# Patient Record
Sex: MALE
[De-identification: names, ages, dates, MRNs, and addresses within clinical notes are randomized per-mention and may not be internally consistent; named-entity substitution may affect disease eponyms.]

---

## 2017-02-24 ENCOUNTER — HOSPITAL ENCOUNTER (OUTPATIENT)
Dept: HOSPITAL 61 - INTRAD | Age: 67
Discharge: HOME | End: 2017-02-24
Attending: FAMILY MEDICINE
Payer: MEDICARE

## 2017-02-24 VITALS — SYSTOLIC BLOOD PRESSURE: 107 MMHG | DIASTOLIC BLOOD PRESSURE: 64 MMHG

## 2017-02-24 VITALS
SYSTOLIC BLOOD PRESSURE: 108 MMHG | SYSTOLIC BLOOD PRESSURE: 108 MMHG | DIASTOLIC BLOOD PRESSURE: 65 MMHG | DIASTOLIC BLOOD PRESSURE: 65 MMHG | SYSTOLIC BLOOD PRESSURE: 108 MMHG | SYSTOLIC BLOOD PRESSURE: 108 MMHG | SYSTOLIC BLOOD PRESSURE: 108 MMHG | DIASTOLIC BLOOD PRESSURE: 65 MMHG | DIASTOLIC BLOOD PRESSURE: 65 MMHG | DIASTOLIC BLOOD PRESSURE: 65 MMHG

## 2017-02-24 VITALS — SYSTOLIC BLOOD PRESSURE: 128 MMHG | DIASTOLIC BLOOD PRESSURE: 76 MMHG

## 2017-02-24 VITALS — SYSTOLIC BLOOD PRESSURE: 128 MMHG | DIASTOLIC BLOOD PRESSURE: 72 MMHG

## 2017-02-24 VITALS — DIASTOLIC BLOOD PRESSURE: 82 MMHG | SYSTOLIC BLOOD PRESSURE: 147 MMHG

## 2017-02-24 VITALS — DIASTOLIC BLOOD PRESSURE: 67 MMHG | SYSTOLIC BLOOD PRESSURE: 117 MMHG

## 2017-02-24 VITALS — DIASTOLIC BLOOD PRESSURE: 79 MMHG | SYSTOLIC BLOOD PRESSURE: 155 MMHG

## 2017-02-24 VITALS — DIASTOLIC BLOOD PRESSURE: 67 MMHG | SYSTOLIC BLOOD PRESSURE: 119 MMHG

## 2017-02-24 VITALS — HEIGHT: 72 IN | WEIGHT: 160 LBS | BODY MASS INDEX: 21.67 KG/M2

## 2017-02-24 VITALS — DIASTOLIC BLOOD PRESSURE: 70 MMHG | SYSTOLIC BLOOD PRESSURE: 116 MMHG

## 2017-02-24 DIAGNOSIS — I70.0: Primary | ICD-10-CM

## 2017-02-24 DIAGNOSIS — I70.211: ICD-10-CM

## 2017-02-24 LAB
ANION GAP SERPL CALC-SCNC: 7 MMOL/L (ref 6–14)
BASOPHILS # BLD AUTO: 0 X10^3/UL (ref 0–0.2)
BASOPHILS NFR BLD: 1 % (ref 0–3)
BUN SERPL-MCNC: 19 MG/DL (ref 8–26)
CALCIUM SERPL-MCNC: 9.2 MG/DL (ref 8.5–10.1)
CHLORIDE SERPL-SCNC: 107 MMOL/L (ref 98–107)
CO2 SERPL-SCNC: 30 MMOL/L (ref 21–32)
CREAT SERPL-MCNC: 1 MG/DL (ref 0.7–1.3)
EOSINOPHIL NFR BLD: 3 % (ref 0–3)
ERYTHROCYTE [DISTWIDTH] IN BLOOD BY AUTOMATED COUNT: 13.4 % (ref 11.5–14.5)
GFR SERPLBLD BASED ON 1.73 SQ M-ARVRAT: 74.8 ML/MIN
GLUCOSE SERPL-MCNC: 108 MG/DL (ref 70–99)
HCT VFR BLD CALC: 45.4 % (ref 39–53)
HGB BLD-MCNC: 15.3 G/DL (ref 13–17.5)
INR PPP: 1 (ref 0.8–1.1)
LYMPHOCYTES # BLD: 2 X10^3/UL (ref 1–4.8)
LYMPHOCYTES NFR BLD AUTO: 28 % (ref 24–48)
MCH RBC QN AUTO: 33 PG (ref 25–35)
MCHC RBC AUTO-ENTMCNC: 34 G/DL (ref 31–37)
MCV RBC AUTO: 96 FL (ref 79–100)
MONOCYTES NFR BLD: 8 % (ref 0–9)
NEUTROPHILS NFR BLD AUTO: 60 % (ref 31–73)
PLATELET # BLD AUTO: 160 X10^3/UL (ref 140–400)
POTASSIUM SERPL-SCNC: 5 MMOL/L (ref 3.5–5.1)
PROTHROMBIN TIME: 12.8 SEC (ref 11.7–14)
RBC # BLD AUTO: 4.71 X10^6/UL (ref 4.3–5.7)
SODIUM SERPL-SCNC: 144 MMOL/L (ref 136–145)
WBC # BLD AUTO: 7.1 X10^3/UL (ref 4–11)

## 2017-02-24 PROCEDURE — C1892 INTRO/SHEATH,FIXED,PEEL-AWAY: HCPCS

## 2017-02-24 PROCEDURE — 76937 US GUIDE VASCULAR ACCESS: CPT

## 2017-02-24 PROCEDURE — 80048 BASIC METABOLIC PNL TOTAL CA: CPT

## 2017-02-24 PROCEDURE — 75625 CONTRAST EXAM ABDOMINL AORTA: CPT

## 2017-02-24 PROCEDURE — G0269 OCCLUSIVE DEVICE IN VEIN ART: HCPCS

## 2017-02-24 PROCEDURE — 36415 COLL VENOUS BLD VENIPUNCTURE: CPT

## 2017-02-24 PROCEDURE — C1760 CLOSURE DEV, VASC: HCPCS

## 2017-02-24 PROCEDURE — C1876 STENT, NON-COA/NON-COV W/DEL: HCPCS

## 2017-02-24 PROCEDURE — 37221: CPT

## 2017-02-24 PROCEDURE — 85027 COMPLETE CBC AUTOMATED: CPT

## 2017-02-24 PROCEDURE — 85610 PROTHROMBIN TIME: CPT

## 2017-02-24 PROCEDURE — C1894 INTRO/SHEATH, NON-LASER: HCPCS

## 2017-02-24 PROCEDURE — 75716 ARTERY X-RAYS ARMS/LEGS: CPT

## 2017-02-24 PROCEDURE — C1769 GUIDE WIRE: HCPCS

## 2017-02-24 PROCEDURE — A4215 STERILE NEEDLE: HCPCS

## 2017-02-24 PROCEDURE — C1758 CATHETER, URETERAL: HCPCS

## 2017-02-24 NOTE — PDOC
Exam








Karina





Assistant


Assistant


WILLIAMS Da Silva





Pre-Procedure Diagnosis


Pre-Procedure Diagnosis


67 YO male smoker with PAD, diminished rt CFA and DP pulses,  right hip 

claudication, and relatively lesser left leg pain.





Post-Procedure Diagnosis


Post-Procedure Diagnosis


Same.


Irregular, at least 60% right CHARLENE stenosis.





Procedure Performed


Procedure Performed


Abdominal aortogram with left leg angio via sono guided left CFA access.


Right lower extremity angio via sono guided right CFA access.


Bilateral CHARLENE kissing stents, covered on right and bare metal on left.





Type of Anesthesia


Type of Anesthesia


Local + Mod sedation





Estimated Blood Loss


EBL:


50 cc





Condition of Patient


Condition of Patient


Stable.  No apparent complication.  Improved right CFA and DP pulses post 

procedure.





Disposition


Disposition


Home from [facility] post recovery, if no problems.  F/u with Dr Nevarez.  Single dose 

of Plavix today, then resume Savaysa tomorrow.  Full report to follow.








JOSR ESCOBAR MD Feb 24, 2017 12:14

## 2017-02-24 NOTE — PDOC
MODERATE SEDATION ASSESSMENT


RISKS/ALTERNATIVES


Risks/Alternatives


Risks and alternatives of this type of sedation and procedure discussed with:


RISK/ALTERNATIVES:  Patient





H & P ON CHART


H & P


H & P on chart and reviewed for co-morbid conditions and appropriate labs.


H&P ON CHART:  Yes





PREGNANCY STATUS


PREG STATUS ASSESSED:  N/A





MEDS/ALLERGIES REVIEWED


Meds/Allergies Reviewed


Medications and Allergies including time and route of recently administered 

narcotics and sedatives.


MEDS/ALLERGIES REVIEWED:  Yes





ASA RATING


ASA RATING:  II





AIRWAY ASSESSMENT


Airway Assessment


Airway patency, oral function limitations, presence  of caps, crowns, dentures, 

partials, and ability to extend neck assessed.


AIRWAY ASSESSMENT:  Yes





MALLAMPATI SCORE


MALLAMPATI SCORE:  II





PRE-SEDATION ASSESSMENT


PRE-SEDATION ASSESSMENT:  Yes








OJSR ESCOBAR MD Feb 24, 2017 11:59

## 2017-02-24 NOTE — PDOC1
History and Physical


Date of Procedure


Date of Admission


2/24/17





Procedure


Procedure


Diagnostic arteriogram +/- intervention





Indication


Indication


65 YO smoker with chronic right hip claudication.   PAD, with diminished right 

CFA and DP pulses compared to left.   Minor left leg pain.





Past Medical History


Past Medical History


See Nursing Pre procedure PMH





Past Surgical History


Past Surgical History


See Nursing Pre Procedure PSH





Current Medications


Current Medications





 Current Medications


Iodixanol (Visipaque 320) 50 ml STK-MED ONCE .ROUTE ;  Start 2/24/17 at 09:54;  

Stop 2/24/17 at 09:55;  Status DC


Iohexol (Omnipaque 300 Mg/ml) 100 ml STK-MED ONCE .ROUTE ;  Start 2/24/17 at 09:

54;  Stop 2/24/17 at 09:55;  Status DC


Lidocaine/Sodium Bicarbonate 20 ml 20 ml STK-MED ONCE IJ ;  Start 2/24/17 at 09:

54;  Stop 2/24/17 at 09:55;  Status DC


Heparin Sodium/ Sodium Chloride 1,500 ml @  As Directed STK-MED ONCE .ROUTE ;  

Start 2/24/17 at 09:54;  Stop 2/24/17 at 09:55;  Status DC


Iodixanol (Visipaque 320) 100 ml STK-MED ONCE .ROUTE ;  Start 2/24/17 at 09:55;

  Stop 2/24/17 at 09:56;  Status DC


Heparin Sodium (Porcine) 10,000 unit STK-MED ONCE .ROUTE ;  Start 2/24/17 at 10:

27;  Stop 2/24/17 at 10:28;  Status DC


Midazolam HCl (Versed) 5 mg STK-MED ONCE .ROUTE ;  Start 2/24/17 at 10:28;  

Stop 2/24/17 at 10:29;  Status DC


Fentanyl Citrate (Fentanyl 5ml Vial) 250 mcg STK-MED ONCE .ROUTE ;  Start 2/24/ 17 at 10:28;  Stop 2/24/17 at 10:29;  Status DC


Heparin Sodium/ Sodium Chloride 1,000 unit 1X  ONCE IART  Last administered on 2 /24/17at 11:40;  Start 2/24/17 at 11:00;  Stop 2/24/17 at 11:01;  Status DC


Lidocaine/Sodium Bicarbonate (Buffered Lidocaine 1%) 20 ml 1X  ONCE IJ  Last 

administered on 2/24/17at 11:40;  Start 2/24/17 at 11:00;  Stop 2/24/17 at 11:01

;  Status DC


Iohexol (Omnipaque 300 Mg/ml) 100 ml 1X  ONCE IART  Last administered on 2/24/ 17at 11:40;  Start 2/24/17 at 11:00;  Stop 2/24/17 at 11:01;  Status DC


Iodixanol (Visipaque 320) 100 ml 1X  ONCE IART  Last administered on 2/24/17at 

11:40;  Start 2/24/17 at 11:00;  Stop 2/24/17 at 11:01;  Status DC


Heparin Sodium (Porcine) 10,000 unit 1X  ONCE IV  Last administered on 2/24/ 17at 11:42;  Start 2/24/17 at 11:00;  Stop 2/24/17 at 11:01;  Status DC


Midazolam HCl (Versed) 5 mg 1X  ONCE IV  Last administered on 2/24/17at 11:41;  

Start 2/24/17 at 11:00;  Stop 2/24/17 at 11:01;  Status DC


Fentanyl Citrate (Fentanyl 5ml Vial) 250 mcg 1X  ONCE IV  Last administered on 2 /24/17at 11:41;  Start 2/24/17 at 11:00;  Stop 2/24/17 at 11:01;  Status DC


Clopidogrel Bisulfate (Plavix) 300 mg 1X  ONCE PO ;  Start 2/24/17 at 12:00;  

Stop 2/24/17 at 12:01





Active Scripts


Active


Reported


DURAGESIC 100mcg/hr (Fentanyl) 1 Each Patch.td72 1 Patch TD Q72H


Atorvastatin Calcium 10 Mg Tablet 10 Mg PO HS


Lisinopril 10 Mg Tablet 10 Mg PO DAILY


Savaysa (Edoxaban Tosylate) 60 Mg Tablet 60 Mg PO DAILY





Allergies


Allergies:  


Coded Allergies:  


     tamsulosin (Verified  Allergy, Intermediate, Unknown, 2/24/17)


     quetiapine (Verified  Adverse Reaction, Intermediate, memory loss, 2/24/17)


     rivaroxaban (Verified  Adverse Reaction, Unknown, leg pain, 2/24/17)





Physical Exam


Vital Signs





 Vital Signs








  Date Time  Temp Pulse Resp B/P Pulse Ox O2 Delivery O2 Flow Rate FiO2


 


2/24/17 11:41   14  99 Room Air  


 


2/24/17 11:30  54      


 


2/24/17 09:26 98.2   119/67    





 98.2       








Lungs:  Clear to auscultation


Heart:  Regular rate


Psych/Mental Status:  Mental status NL


Vascular


2+ left CFA pulse.


+/- right CFA pulse.


2+ left DP pulse


1+ right DP pulse.


No foot ulcerations.





Diagnostic Data/Imaging


Images


No prior imaging available





Assessment


Assessment


65 YO smoker with PAD, diminished right CFA and DP pulses, and right hip 

claudication.


Problems:  





Plan


Plan


Diagnostic abdominal aortogram with bilateral lower extremity r/o +/- 

intervention.








JOSR ESCOBAR MD Feb 24, 2017 12:06

## 2017-02-25 NOTE — RAD
Abdominal aortogram with left lower extremity arteriogram via

ultrasound-guided left common femoral artery access



Right lower extremity arteriogram via ultrasound-guided right common femoral

artery access



Bilateral common iliac artery kissing stent deployment



Indication: 66-year-old male smoker with PAD, with diminished right groin and

distal pulses, with significant right hip claudication, and with lesser left

leg pain.  Diagnostic arteriogram, with possible intervention, has been

requested by primary care physician.



Fluoroscopy time: 11.6 minutes



Kerma-area Product:    99 Gycm2



Contrast material: 55 cc Omnipaque 300.     130 cc Visipaque 320.



Anesthesia: 55 minutes moderate sedation was provided utilizing a total of 2

mg Versed and 75 mcg been no, IV. The patient was appropriately monitored by a

qualified independent observer throughout the time of moderate sedation.



Consent: The procedure was explained in its entirety to the patient and/or the

patient's designated representative by a member of the treatment team. This

included a discussion of risks and benefits and commonly accepted alternatives

to the procedure, as well as expected consequences of no treatment at all.

Discussion of risks included, but was not limited to, those that are most

frequent and those that are rare, but possibly severe or life-threatening, as

well as the possibility of unforeseen complications.



Sterility: All elements of maximal sterile barrier technique were utilized,

including cap, mask, sterile gown, sterile gloves, large sterile sheet,

appropriate hand hygiene, and 2% chlorhexidine for cutaneous antisepsis.





Procedure: Informed consent was obtained from the patient. He was placed

supine on the angiography table. Preliminary ultrasound examination of left

groin groin revealed wide patency of left common femoral artery, which was

documented with a single hard copy ultrasound image. Left groin was then

prepped and draped in the usual sterile fashion, utilizing all elements of

maximal sterile barrier technique, as described above. Moderate sedation was

provided with IV Versed and fentanyl. Using aseptic technique, local

anesthesia, direct ultrasound guidance, and the micropuncture system, a 5

[place] left common femoral artery sheath was successfully introduced.



Abdominal aortogram: A 5 [place] Omni Flush catheter was advanced through the

left groin sheath and was positioned within suprarenal abdominal aorta.

Omnipaque 300 was injected and abdominal aortogram DSA images were obtained.

Findings: Infrarenal abdominal aortic shows at least moderate atherosclerotic

calcification and plaquing, without significant stricture, and without

aneurysmal dilatation. Single main renal arteries appear widely patent,

bilaterally. Celiac trunk and SMA are patent. There is severe stenosis at

origin of small caliber DIMA.



Oblique pelvis injections: The Omni Flush catheter was withdrawn into terminal

aorta, just above bifurcation. Omnipaque 300 was again injected and DSA images

were obtained over pelvis in [place]ian and BAXTER projections.

Findings: Markedly irregular atherosclerotic plaquing within proximal segment

of right common iliac artery includes a localized high-grade stenosis. Left

common iliac artery shows only mild eccentric plaquing. External iliac

arteries are widely patent, bilaterally. Both hypogastric arteries are widely

patent, as well.



Left lower extremity arteriogram: The Omni Flush catheter was withdrawn into

distal segment of ipsilateral left external iliac artery. Visipaque was

injected and digital angiographic images were obtained from groin through

foot.

Findings: Left common femoral artery, bifurcation, and deep femoral artery are

widely patent. Left SFA-popliteal arterial segment is also widely patent, as

is left tibial trifurcation, with good three-vessel distal runoff. 



Bilateral common iliac artery kissing stents:  Given this patient's

debilitating right hip claudication, the decision was made to proceed with

percutaneous stenting of proximal right common iliac artery, requiring

simultaneous proximal left common iliac artery stenting to prevent

displacement of plaque from right common iliac artery origin into left side. 

Preliminary ultrasound examination over right groin revealed widely patent

right common femoral artery. This was documented with a single hard copy

ultrasound image. Right groin was then prepped and draped in the usual sterile

fashion, utilizing all elements of maximal sterile barrier technique, as

described above. Using aseptic technique, local anesthesia, direct ultrasound

guidance, and the micropuncture system, a 7 [place] 23 cm long right common

femoral artery sheath was successfully introduced over a Ibrahim wire, which was

positioned with its tip within descending thoracic aorta.  Using aseptic

technique and local anesthesia, the previously placed 5 [place] left common

femoral artery sheath was exchanged for a 7 [place] 23 cm long sheath over a

Ibrahim wire, which was also positioned with its tip within descending thoracic

aorta.  5000 heparin was given bolus IV.  Due to the markedly irregular nature

of right common iliac artery plaquing, a 7 mm x 39 mm atrium covered balloon

expandable stent was selected, and was advanced through the 7 [place] right

groin sheath and was carefully positioned across proximal right common iliac

artery, utilizing magnification fluoroscopic guidance and road mapping

technique.   A noncovered 8 mm x 29 mm Cordis Mary Ann balloon expandable stent

was advanced through the 7 [place] left groin sheath and was carefully

positioned across proximal left common iliac artery.  The 2 stents were then

simultaneously deployed under fluoroscopic control.   Post dilatation both

stents was then simultaneously performed utilizing 9 mm x 40 mm Cordis extreme

PTA balloons, each inflated to a peak pressure of 12 jv.  Completion DSA

images were then obtained, which brisk antegrade flow through bilateral common

iliac arteries, without complicating dissection or thrombosis.



Right lower extremity arteriogram:  The right groin 7 [place] 23 cm long sheath

was withdrawn and was positioned with its tip within anterolateral distal

right external iliac artery. Visipaque was injected and digital angiographic

images were obtained from right groin through distal calf..

Findings: Right common femoral artery and deep femoral artery are widely

patent. Right SFA-popliteal arterial segment is unremarkable. Right tibial

trifurcation is widely patent, with satisfactory three-vessel distal runoff

through visualized distal right calf.



Patient tolerated the procedures well without apparent location. Hemostasis

was achieved at both groin puncture sites utilizing the Angio-Seal system,

bilaterally.  Completion post stent placement physical examination revealed

significantly improved right common femoral artery and DP pulses.



Impression:

1.  At least moderate atherosclerotic plaquing throughout infrarenal abdominal

aorta, without aneurysmal dilatation and without significant stricture.



2.  Markedly irregular plaquing within proximal right common iliac artery,

including localized severe stenosis, may well be responsible for the patient's

significant right hip claudication.    Therefore, bilateral common iliac

artery kissing stents were successfully and uneventfully deployed.



3.  Widely patent SFA-popliteal segments, tibial trifurcations, and tibial

trifurcation runoff vessels, bilaterally.

## 2020-03-25 ENCOUNTER — HOSPITAL ENCOUNTER (OUTPATIENT)
Dept: HOSPITAL 61 - PNCL | Age: 70
End: 2020-03-25
Attending: ANESTHESIOLOGY
Payer: MEDICARE

## 2020-03-25 DIAGNOSIS — K51.90: ICD-10-CM

## 2020-03-25 DIAGNOSIS — I73.9: ICD-10-CM

## 2020-03-25 DIAGNOSIS — I10: Primary | ICD-10-CM

## 2020-03-25 DIAGNOSIS — J44.9: ICD-10-CM

## 2020-03-25 DIAGNOSIS — M13.88: ICD-10-CM

## 2020-03-25 DIAGNOSIS — H91.90: ICD-10-CM

## 2020-03-25 PROCEDURE — G0463 HOSPITAL OUTPT CLINIC VISIT: HCPCS

## 2020-03-25 NOTE — PAIN
DATE OF SERVICE:  03/25/2020



INITIAL CONSULTATION FOR PAIN CLINIC



CHIEF COMPLAINT:  Neck and left upper extremity pain.



HISTORY OF PRESENT ILLNESS:  This is a 69-year-old male who presents with

history of pain in the base of neck and left shoulder since about 2010.  The

patient has had many hard labor jobs, working for the railroad, also automotive

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

labor body work for automobiles.  The patient reports the pain is increasing in

his left upper extremity, radiating to the anterior bicep from the shoulder and

neck and into the anterior deltoid, anterior forearm, into the hand at times

with numbness and tingling.  The patient has this across the back and in the mid

upper back as well, has pain in the knees and the bilateral ankles and hips. 

The patient reports the pain in the neck and arm, it is becoming more constant,

sharp, stabbing, tingling with numbness, radiating to the left upper extremity,

changes during the day with activity, worse with activity, better with resting,

burning, cramping, cold and aching.  The patient reports it awakens him from

sleep at least 2-5 times a night, can affect his bowel and bladder control, but

no loss of continence.  The patient reports it does affect his ability to walk

fairly significantly, mostly in the knee and hip pain.  The patient has tried

physical therapy in the distant past, nothing recently.  He is doing some

stretching and strengthening and some heat applications which he reports is

quite soothing at that time in the neck and shoulder, but does not last long. 

The patient has been on fentanyl patches as well as hydrocodone, still taking

those today, which he reports does decrease the pain by about 50%.  The patient

did have an MRI scan of the cervical spine dated 03/05/2020 showing multiple

levels of broad-based disk protrusion at C3-C4, C4-C5, C5-C6 with bulging at

C6-C7 with broad-based protruded disk at C3-C3, left paracentral disk osteophyte

complex at C4-C5, C5-C6 large disk osteophyte complex as well involving the

right to left ventral thecal sac and cord and compression of the cord noted. 

The patient reports his disability rating from 0-10, 10 being the worst, is an 8

with family home responsibilities and recreation, 10 with occupation, 3 with

social activity, self-care and life support activities.



PAST MEDICAL HISTORY:  Significant for hearing loss, COPD, cigarette smoking,

continues to smoke 1-1-1/2 half pack a day for the past 55 years, history of

hypertension, peripheral vascular disease, ulcerative colitis, arthritis.



PREVIOUS SURGERY:  Include arterial stents in the bilateral iliacs,

hydrocelectomy, lumbar laminectomy x 3, venous ligation, kidney stone

extractions and shoulder surgery.



CURRENT MEDICATIONS:  Include lisinopril, atorvastatin, hydrocodone, fentanyl

patches and Savaysa.



ALLERGIES:  THE PATIENT IS ALLERGIC TO XARELTO, SEROQUEL, AND TAMSULOSIN.



FAMILY HISTORY:  Significant for heart disease, prostate cancer and memory

problems.



SOCIAL HISTORY:  The patient does not drink alcohol, does not use any illegal,

illicit or recreational drugs, does smoke about 1-1-1/2 packs of cigarettes, has

for 55 years, continues to smoke.  He is single.  Lives locally in [place], [place] and still does auto body repair currently.



REVIEW OF SYSTEMS:  The patient's review of systems is positive for those items

mentioned in history of present illness.  All systems reviewed and otherwise

negative.  It is complete, full and well documented on the patient's chart.



PHYSICAL EXAMINATION:

VITAL SIGNS:  The patient's blood pressure 143/81, pulse 55, respirations 18,

temperature is 98.3 degrees Fahrenheit, height is 6 feet and weight is 157

pounds.

GENERAL:  The patient is awake, alert, oriented, appropriate, very pleasant

demeanor.

HEENT:  Head shows normocephalic, atraumatic.  Extraocular movements are intact

and symmetrical.  Oral cavity:  Mucous membranes moist and pink.  Dentition is

intact.

NECK:  Shows anterior throat supple without palpable lymphadenopathy noted. 

Swallow reflex symmetrical.

CHEST:  Shows normal on inspection.  Breath sounds are clear bilaterally.

HEART:  Shows S1, S2 clear.  No murmurs auscultated.

ABDOMEN:  Soft, nontender, nondistended.  No palpable organomegaly is noted.  No

rebound or guarding demonstrated.

BACK:  Shows spine grossly in the midline, normal-appearing cervical lordotic

curvature, some minor increase in thoracic kyphotic curvature and flattening of

lumbar lordotic curvature with well-healed surgical scar noted in the lumbar

distribution.  Cervical paraspinous muscle shows symmetrical on inspection; on

palpation shows some moderate tenderness throughout the upper, middle and lower

distribution of paraspinous muscles bilaterally, but only diffusely without

significant radiation, no trigger points.  The patient shows good rotational

motion of cervical spine, both laterally as well as extension and flexion

greater than 45 degrees, right and left lateral as well as full extension, full

forward flexion with some moderate pain with all these maneuvers, especially

with turning to the left side with pain in the base of the neck radiating to the

left shoulder and trapezius, but without radiating into the arm:  The patient's

upper extremities show deep tendon reflexes at 2+ in the biceps and triceps

tendons.  Motor exam is 5/5 with  strength, bicep and tricep flexion and

symmetrical.  Peripheral pulses are 2+ radial distribution.  No peripheral edema

is noted.  Upper extremities are warm and dry to touch, equal in color and

appearance.  Shoulder shrug is strong and intact without loss of strength on

resistance as is abduction of the shoulders 90 degrees without loss of strength

with some minor pain reported in the base of the neck on the left side only

without radiation.  The patient's skin shows warm and dry, good turgor.  No

edema.  No sores, rashes or bruising throughout.



IMPRESSION:

1.  This is a 69-year-old male with a long history about 10 plus years pain in

the base of the neck, shoulders, upper extremities, upper back and now radiating

into the left upper extremity in a radicular fashion.

2.  MRI scan of cervical spine as noted.

3.  Hypertension.

4.  Chronic obstructive pulmonary disease.

5.  Hearing loss.

6.  Arthritis.

7.  Ulcerative colitis.

8.  Peripheral vascular disease.



PLAN:  Options were discussed with the patient including conservative medical

managements, physical therapies and interventional techniques.  He would like to

pursue interventional techniques.  We discussed a cervical epidural steroid

injection using description as well as anatomical models to describe the

procedure.  The patient would like to proceed with this.  We will first check

with his prescribing physician to hold his blood thinner, which is Savaysa for

48 hours and if deemed safe and appropriate, we will have him return for

cervical epidural steroid injection at that time.  In the meantime, the patient

will continue with stretching and strengthening exercises.  Also discussed heat

application to the neck and shoulders as well.  The patient will await clearance

to hold his blood thinner.  We will have him return for cervical epidural

steroid injection potentially at that time.

 



______________________________

YUDITH GOULD MD



DR:  Zane  JOB#:  [id number] / [id number]

DD:  03/25/2020 13:04  DT:  03/25/2020 14:01



LASHELL Zimmerman MD

## 2022-01-06 ENCOUNTER — HOSPITAL ENCOUNTER (OUTPATIENT)
Dept: HOSPITAL 61 - US | Age: 72
End: 2022-01-06
Attending: FAMILY MEDICINE
Payer: MEDICARE

## 2022-01-06 DIAGNOSIS — I65.23: Primary | ICD-10-CM

## 2022-01-06 PROCEDURE — 93880 EXTRACRANIAL BILAT STUDY: CPT

## 2022-01-07 NOTE — RAD
EXAM: Bilateral carotid duplex with waveform analysis.



CLINICAL HISTORY: Reason: SUBCLAVIAN STEAL SYNDROME, SUBCLAVIAN ARTERY history of smoking, dizziness,
 lightheadedness, weakness, numbness, unusual headaches, hypertension .



TECHNIQUE: Longitudinal and transverse sonographic images of the bilateral carotid arteries was perfo
rmed utilizing grayscale, color and spectral Doppler techniques.



COMPARISON: None



FINDINGS:

Right Carotid: There is mild calcified atherosclerosis proximally.

Left Carotid: Minimal calcified atherosclerosis proximally.  

Vertebrals: Antegrade flow bilaterally.



____________________________________



Right:

PSV CCA (cm/s):   68 cm/s

PSV ICA (cm/s):    100 cm/s

EDV ICA (cm/s):    35 cm/s

PSV ECA (cm/s):    84 cm/s

Subclavian PSV (cm/s):  88 cm/s

ICA/CCA Ratio:    Less than 2.0



Left:

PSV CCA (cm/s):   72 cm/s

PSV ICA (cm/s):    97 cm/s

EDV ICA (cm/s):    35 cm/s

PSV ECA (cm/s):    81 cm/s

Subclavian PSV (cm/s):  137 cm/s

ICA/CCA Ratio:    Less than 2.0



____________________________________





  

IMPRESSION:    

Mild bilateral internal carotid artery atherosclerosis, corresponding to less than 50 percent stenosi
s. No evidence of subclavian steal.







Consensus Panel Gray-scale and Doppler US Criteria for Diagnosis of ICA Stenosis

Degree of Stenosis (%)  ICA PSV

(Cm/sec)  Plaque Estimate (%)*

Normal  <125  None

<50  <125  <50

50-69  125-230  >50

>70 but 

< near occlusion  >230  >50

Near occlusion  High, low, or undetectable  Visible

Total occlusion  Undetectable  Visible, no detectable lumen

*Plaque estimate (diameter reduction) with gray-scale and color Doppler US



Degree of Stenosis (%)  ICA/CCA PSV Ratio     ICA EDV

(cm/sec)

Normal  <2.0  <40

<50  <2.0  <40

50-69  2.0-4.0  

>70 but 

< near occlusion  >4.0  >100

Near occlusion  Variable  Variable

Total occlusion  Not applicable  Not applicable



Electronically signed by: Sharath Tamayo MD (1/7/2022 10:09 AM) [id number]

## 2022-02-09 ENCOUNTER — HOSPITAL ENCOUNTER (OUTPATIENT)
Dept: HOSPITAL 61 - ECHO | Age: 72
End: 2022-02-09
Attending: FAMILY MEDICINE
Payer: COMMERCIAL

## 2022-02-09 DIAGNOSIS — Z85.038: Primary | ICD-10-CM

## 2022-02-09 PROCEDURE — 93660 TILT TABLE EVALUATION: CPT

## 2022-02-13 NOTE — CARD
MR#: [id number]

Account#: [id number]

Accession#: [id number]

Date of Study: 02/09/2022

Ordering Physician: LASHELL HOOPER

Referring Physician: LASHELL HOOPER

Tech: RISHI Peacock RN





--------------- APPROVED REPORT --------------





EXAM

Tilt Table

Attending Nurse: RISHI Peacock RN



HISTORY

The Patient is a 71 year-old male with a history of syncope



INDICATIONS

syncope



PROCEDURE

After explaining the risks, benefits, and alternative options, informed consent was obtained from the
 patient.

Base - lineRhythm: Sinus BradycardiaHR: 57 bpmBP: 165/95kmFtY1 Sat: 97 %

Fxee8509Ljylvu: Sinus BradycardiaHR: 57 bpmBP: 163/52jgCzP7 Sat: 97 %

Poub8407Rdsvtd: Sinus BradycardiaHR: 60 bpmBP: 152/19esXcC1 Sat: 96 %

80' Rzga1196Sfwanq: SinusHR: 61 bpmBP: 105/34vtGmE3 Sat: 96 %

80' Nwwq3240Ydugze: SinusHR: 62 bpmBP: 120/90muQcK4 Sat: 97 %

80' Jkcb0576Mctedl: Sinus BradycardiaHR: 59 bpmBP: 119/18hbUrB5 Sat: 100 %

80' Rtjw9093Glyiqe: SinusHR: 69 bpmBP: 111/38zkFeS1 Sat: 100 %

80' Wqyk0548Lqzsgd: SinusHR: 64 bpmBP: 129/26iqIfC5 Sat: 98 %

80' Hfhp2784Ehnjec: SinusHR: 63 bpmBP: 121/22ooXrX8 Sat: 98 %

80' Rtkf6288Pkikrz: SinusHR: 61 bpmBP: 115/89kzEyT2 Sat: 98 %

Sseq8959Tjglds: Sinus BradycardiaHR: 52 bpmBP: 119/73wiMiD9 Sat: 98 %

Nrdp8963Fditjt: Sinus BradycardiaHR: 50 bpmBP: 113/48rvBvR0 Sat: 98 %

O2 Sat: 98 %



CONCLUSION

Tilt table test did not show any evidence of neurocardiogenic syncope.



Signed by : Didier Cope, 

Electronically Approved : 02/13/2022 09:09:41

## 2022-04-12 ENCOUNTER — HOSPITAL ENCOUNTER (OUTPATIENT)
Dept: HOSPITAL 61 - NM | Age: 72
End: 2022-04-12
Attending: INTERNAL MEDICINE
Payer: COMMERCIAL

## 2022-04-12 DIAGNOSIS — I34.0: Primary | ICD-10-CM

## 2022-04-12 DIAGNOSIS — R07.9: ICD-10-CM

## 2022-04-12 DIAGNOSIS — R55: ICD-10-CM

## 2022-04-12 PROCEDURE — A9500 TC99M SESTAMIBI: HCPCS

## 2022-04-12 PROCEDURE — 93306 TTE W/DOPPLER COMPLETE: CPT

## 2022-04-12 PROCEDURE — 93017 CV STRESS TEST TRACING ONLY: CPT

## 2022-04-12 PROCEDURE — 78452 HT MUSCLE IMAGE SPECT MULT: CPT

## 2022-04-12 PROCEDURE — C8929 TTE W OR WO FOL WCON,DOPPLER: HCPCS

## 2022-04-12 NOTE — RAD
MR#: [id number]

Account#: [id number]

Accession#: [id number]

Date of Study: 04/12/2022

Ordering Physician: LE JOEL, 

Referring Physician: MATEUS LANDRY Tech: FEDERICA Murillo ARRT (R) (N)





--------------- APPROVED REPORT --------------





Test Type:          Pharmacological

Stress Nurse/Tech: July Cade RN

Test Indications: Chest pain and episodes of LOC

Cardiac History: Hypertension,smoker

Medications:     See Electronic Medical Record

Medical History: See Electronic Medical Record

Resting ECG:     SB

Resting Heart Rate: 53 bpm

Resting Blood Pressure: 144/78mmHg

Pretest Chest Pain: No chest pain



Nurse/Tech Notes

S1,S2 and lungs diminished in the bases.

Consent: The procedure was explained to the patient in lay terms. Informed consent was witnessed. Mark
eout was entered into [employer]. History and Stress Test performed by FEDERICA Murillo ARRT (R) 
(N)



Pharm. Details

Pharmacologic stress testing was performed using 0.4mg per 5ml of regadenoson given intravenously ove
r 7-10 seconds.



Stress Symptoms

Dyspnea,Nausea



POST EXERCISE

Reason for Termination: Infusion complete

Target HR: No

Max HR: 98 bpm

77% of Maximum Predicted HR: 126 bpm

Max Blood Pressure: 146/65mmHg

Blood Pressure response to exercise: Normal blood pressure response during stress.

Heart Rate response to exercise: WNL

Chest Pain: No. 

Arrhythmia: No. 

ST Change: No. 



INTERPRETATION

Stress EKG Conclusion: No evidence of stress induced EKG changes. 



Imaging Protocol

IMAGE PROTOCOL: Rest Tc-99m/stress Tc-99m 1 day



Rest:            Stress:         Viability:   

Radiopharm.Tc99m QlvfymdhjUk69v Sestamibi

Dose10.2mCi            31.7mCi            

Img Date  04/12/2022 04/12/2022      

Inj-Img Syxs05kzx.           60min.           



Rest Admin Site:IV - Right AntecubitalAdministrator:FEDERICA Murillo ARRT (R)(N)

Stress Admin Site: IV - Right AntecubitalAdministrator: RT GAVIN Aguilera)(N)



STRESS DATA

End Diast. Vol.94.0mlAv. Heart Rate53.0bpm

End Syst. Vol.23.0mlCO Index BSA0.0L/min

Myocardial Ltjr758.0gEject. Pwrjliuf97.0%



Stress Rates

Pk. Fill Rate2.22EDV/secLVtime Pk. Fill 174.91msec

Pk. Empty Rate2.86ESV/secLVtime Pk. Qjgbd313.09msec

1/3 Pk. Fill1.47EDV/sec



Stress Scores

Regional WT0.00Summed WT0.00

Regional WM0.00Summed WM0.00



The rest and stress images show normal perfusion, normal contraction and thickening.



LV Perf. Quant

17 Seg. SSS0.00

17 Seg. SRS1.00

17 Seg. SDS0.00

Stress Defect Extent (% LAD)0.00Rest Defect Extent (% LAD)0.00Rev. Defect Extent (% LAD)0.00

Stress Defect Extent (% LCX) 0.00Rest Defect Extent (% LCX)0.00Rev. Defect Extent (% LCX)0.00

Stress Defect Extent (% RCA)0.00Rest Defect Extent (% RCA)0.00Rev. Defect Extent (% RCA)0.00

Stress Defect Extent (% GENNA)0.00Rest Defect Extent (% GENNA)3.30Rev. Defect Extent (% GENNA)0.00



Other Information

Quality:Average

Risk Assessment: Low Risk



Conclusion

1. No evidence of EKG changes with stress testing.

2. Normal perfusion at stress/rest.

3. Low risk study.

4. EF > 60%.



Signed by : Devendra Rey, 

Electronically Approved : 04/12/2022 16:53:46

## 2022-04-13 NOTE — CARD
MR#: [id number]

Account#: [id number]

Accession#: [id number]

Date of Study: 04/12/2022

Ordering Physician: LE JOEL, 

Referring Physician: Gerard LANDRY: Bridget Storm [facility]





--------------- APPROVED REPORT --------------





EXAM: Two-dimensional and M-mode echocardiogram with Doppler and color Doppler.



Other Information 

Quality : AverageHR: 52bpm

Rhythm : NSR



INDICATION

Chest Pain 

Syncope 



2D DIMENSIONS 

Left Atrium(2D)3.6 (1.6-4.0cm)IVSd0.9 (0.7-1.1cm)

Aortic Root(2D)3.9 (2.0-3.7cm)LVDd4.3 (3.9-5.9cm)

LVOT Diameter2.0 (1.8-2.4cm)PWd1.3 (0.7-1.1cm)

LVDs3.3 (2.5-4.0cm)FS (%) 22.9 %

SV38.0 mlLVEF(%)46.3 (>50%)



Aortic Valve

LVOT Peak Esteban.88.6cm/sLVOT  VTI 23.47cm



Pulmonary Valve

PV Peak Kpwgxxyo26.1cm/sPV Peak Grad.3mmHg



Tricuspid Valve

TR P. Xmlxxpps770rd/sRAP APZGEYKW22itVg

TR Peak Gr.20mmHg



Pulmonary Vein

S1 Iatcoept31.3cm/sD2 Wunprkwu75.9cm/s



 LEFT VENTRICLE 

The left ventricle is normal size. There is normal left ventricular wall thickness. The left ventricu
lar systolic function is normal and the ejection fraction is within normal range. EF 55% There is nor
mal LV segmental wall motion. Tissue Doppler imaging reveals mild left ventricular diastolic dysfunct
ion. No left ventricle thrombus noted on this study. There is no ventricular septal defect visualized
. There is no left ventricular aneurysm. There is no mass noted in the left ventricle.



 RIGHT VENTRICLE 

The right ventricle is normal size. There is normal right ventricular wall thickness. The right ventr
icular systolic function is normal.



 ATRIA 

The left atrium size is normal. The right atrium size is normal. The interatrial septum is intact wit
h no evidence for an atrial septal defect or patent foramen ovale as noted on 2-D or Doppler imaging.




 AORTIC VALVE 

The aortic valve is normal in structure and function. Doppler and Color Flow revealed no significant 
aortic regurgitation. There is no significant aortic valvular stenosis. There is no aortic valvular v
egetation.



 MITRAL VALVE 

The mitral valve is normal in structure and function. There is no evidence of mitral valve prolapse. 
There is no mitral valve stenosis. Doppler and Color-flow revealed mild mitral regurgitation.



 TRICUSPID VALVE 

The tricuspid valve is normal in structure and function. Doppler and Color Flow revealed no tricuspid
 valve regurgitation noted. There is no tricuspid valve stenosis. 



 PULMONIC VALVE 

The pulmonary valve is normal in structure and function. Doppler and Color Flow revealed no pulmonic 
valvular regurgitation. There is no pulmonic valvular stenosis.



 GREAT VESSELS 

The aortic root is normal in size. The ascending aorta is mildly dilated at 3.8 cm. The pulmonary art
estiven is normal. The IVC is normal in size and collapses >50% with inspiration.



 PERICARDIAL EFFUSION 

There is no evidence of significant pericardial effusion.



Critical Notification

Critical Value: No



<Conclusion>

The left ventricular systolic function is normal and the ejection fraction is within normal range. EF
 55%

There is normal LV segmental wall motion.

The ascending aorta is mildly dilated at 3.8 cm.



Signed by : Devendra Rey, 

Electronically Approved : 04/13/2022 07:17:05